# Patient Record
Sex: FEMALE | Race: WHITE | Employment: OTHER | URBAN - METROPOLITAN AREA
[De-identification: names, ages, dates, MRNs, and addresses within clinical notes are randomized per-mention and may not be internally consistent; named-entity substitution may affect disease eponyms.]

---

## 2018-06-19 PROBLEM — E66.01 SEVERE OBESITY (BMI 35.0-39.9): Status: ACTIVE | Noted: 2018-06-19

## 2018-09-28 ENCOUNTER — HOSPITAL ENCOUNTER (OUTPATIENT)
Dept: SLEEP MEDICINE | Age: 78
Discharge: HOME | End: 2018-09-28
Payer: MEDICARE

## 2018-09-28 PROCEDURE — 95806 SLEEP STUDY UNATT&RESP EFFT: CPT

## 2019-02-22 ENCOUNTER — PATIENT OUTREACH (OUTPATIENT)
Dept: CASE MANAGEMENT | Age: 79
End: 2019-02-22

## 2019-02-22 NOTE — PROGRESS NOTES
Ambulatory Care Coordination - Social Work Assessment   Date of referral?    Referral from which RN CM/other source? 2/20/19    Dr. Ailyn Garcia   Previously referred? If so, reason and brief outcome na   Reason for current referral: Home assistance; senior resources - dtr wants pt to be more physically active    Living situation: Lives alone in a private residence with 3 steps to enter   Insurance type? Prescription drug plan? Affordable meds? Obtained where? [facility] involved? Medicare /Cigna. No drug plan. Meds are affordable. [facility]   Income information (if needed):    Food stamps? SS plus a small pension    No food stamps   Transportation ? Adult children   Housing situation? Housing assistance needed? Private residence   Sources of Support: Family? 4 adult children live in town; supportive   [address] involved? CLTC aides/pvt  pay aides? na   DME? [place]   Ambulatory? ADLs - assistance required? Assistive device needed for ambulation? Independent with ambulation and ADLs. Sedentary, per mila, and somewhat unsteady   Referral to CLTC/Medicaid needed? CLTC services explained along with income/asset eligibility. Pt likely not eligible with current level of functioning. Likely over  income for eligibility secondary to assets   Referral to Medicare Extra Help/LIS program needed? na   Small home repair needed? na   MOW referral?  Adequate nutrition? Adult children order groceries on line and pick them up    [place] Risk Assessment? Completed during 2/14 OV   Depression screening? Pt has diagnosis. Goes to counseling occasionally    ACP set up? Needs information? On file   CM Assessed Risk for Readmission:       Patient stated Risk for Readmission:       na     Any other concerns/questions? na   Next steps: See below     BRAD CM in receipt of referral from Dr. Ailyn Garcia.  Spoke with pt's Kyle zaragoza will outreach with [place] on Aging to inquire about and initiate assessment for homemaker, aide ([employer] Program) and medical transportation programs. Will also contact [employer] to inquire about programs, exercise activities, etc.   BRAD PRESSLEY also provided mila with info about Pill Pack as an easier alternative to helping pt manage her meds. No RN CM services needed at this time. Dr. Noe Almonte updated. PLAN  BRAD PRESSLEY will open case for 30 days. Outreach in 2 weeks to check on status of linkage with [employer] and [place] on Aging      This note will not be viewable in [address]

## 2019-03-12 ENCOUNTER — PATIENT OUTREACH (OUTPATIENT)
Dept: CASE MANAGEMENT | Age: 79
End: 2019-03-12

## 2019-03-12 NOTE — PROGRESS NOTES
BRAD PRESSLEY outreach with pt's mila, Andrés Tenorio.  She has the contact info for [employer] on Aging and [employer] [facility] and is aware of how to pursue those programs/services. However, in conversation with pt, family has decided to pursue a referral for outpatient therapy at this time. Carin Grande, will contact Dr. Chinedu Douglas office to obtain a  referral.    PLAN  BRAD PRESSLEY will open case until 3/29. Outreach in 2 weeks to check on status of  o/p therapy referral       This note will not be viewable in [address]

## 2019-03-29 ENCOUNTER — PATIENT OUTREACH (OUTPATIENT)
Dept: CASE MANAGEMENT | Age: 79
End: 2019-03-29

## 2019-03-29 NOTE — PROGRESS NOTES
BRAD PRESSLEY outreach with pt's lucia, Ruy Baker.  She contacted Dr. Jefferson Marte referral coordinator on 3/21, left message requesting referral for o/p therapy. No response yet. She will call Dr. Jefferson Marte office again and follow up. No further needs/concerns expressed by daughter. Outpatient therapy is what the family wants to pursue at this time. Case will be closed. Lucia has BRAD PRESSLEY's contact # if she needs assistance in the future. This note will not be viewable in [address]

## 2022-09-30 ENCOUNTER — HOSPITAL ENCOUNTER (EMERGENCY)
Age: 82
Discharge: HOME | End: 2022-10-01
Attending: EMERGENCY MEDICINE
Payer: MEDICARE

## 2022-09-30 ENCOUNTER — HOSPITAL ENCOUNTER (EMERGENCY)
Dept: GENERAL RADIOLOGY | Age: 82
Discharge: HOME | End: 2022-10-03
Payer: MEDICARE

## 2022-09-30 VITALS
DIASTOLIC BLOOD PRESSURE: 68 MMHG | SYSTOLIC BLOOD PRESSURE: 152 MMHG | OXYGEN SATURATION: 98 % | TEMPERATURE: 97.8 F | HEART RATE: 89 BPM | RESPIRATION RATE: 16 BRPM

## 2022-09-30 DIAGNOSIS — R11.0 NAUSEA WITHOUT VOMITING: ICD-10-CM

## 2022-09-30 DIAGNOSIS — B34.9 VIRAL SYNDROME: Primary | ICD-10-CM

## 2022-09-30 LAB
ALBUMIN SERPL-MCNC: 3.4 G/DL (ref 3.2–4.6)
ALBUMIN/GLOB SERPL: 0.8 {RATIO} (ref 1.2–3.5)
ALP SERPL-CCNC: 59 U/L (ref 50–130)
ALT SERPL-CCNC: 12 U/L (ref 12–65)
ANION GAP SERPL CALC-SCNC: 8 MMOL/L (ref 4–13)
AST SERPL-CCNC: 19 U/L (ref 15–37)
BILIRUB SERPL-MCNC: 0.7 MG/DL (ref 0.2–1.1)
BUN SERPL-MCNC: 14 MG/DL (ref 8–23)
CALCIUM SERPL-MCNC: 9.8 MG/DL (ref 8.3–10.4)
CHLORIDE SERPL-SCNC: 99 MMOL/L (ref 101–110)
CO2 SERPL-SCNC: 26 MMOL/L (ref 21–32)
CREAT SERPL-MCNC: 1.04 MG/DL (ref 0.6–1)
ERYTHROCYTE [DISTWIDTH] IN BLOOD BY AUTOMATED COUNT: 12.7 % (ref 11.9–14.6)
GLOBULIN SER CALC-MCNC: 4.4 G/DL (ref 2.3–3.5)
GLUCOSE SERPL-MCNC: 139 MG/DL (ref 65–100)
HCT VFR BLD AUTO: 45.1 % (ref 35.8–46.3)
HGB BLD-MCNC: 14.7 G/DL (ref 11.7–15.4)
MAGNESIUM SERPL-MCNC: 1.4 MG/DL (ref 1.8–2.4)
MCH RBC QN AUTO: 28.3 PG (ref 26.1–32.9)
MCHC RBC AUTO-ENTMCNC: 32.6 G/DL (ref 31.4–35)
MCV RBC AUTO: 86.9 FL (ref 79.6–97.8)
NRBC # BLD: 0 K/UL (ref 0–0.2)
PLATELET # BLD AUTO: 192 K/UL (ref 150–450)
PMV BLD AUTO: 9 FL (ref 9.4–12.3)
POTASSIUM SERPL-SCNC: 3.6 MMOL/L (ref 3.5–5.1)
PROT SERPL-MCNC: 7.8 G/DL (ref 6.3–8.2)
RBC # BLD AUTO: 5.19 M/UL (ref 4.05–5.2)
SODIUM SERPL-SCNC: 133 MMOL/L (ref 136–145)
TROPONIN I SERPL HS-MCNC: 10.5 PG/ML (ref 0–14)
TSH, 3RD GENERATION: 0.77 UIU/ML (ref 0.36–3.74)
WBC # BLD AUTO: 9.2 K/UL (ref 4.3–11.1)

## 2022-09-30 PROCEDURE — 84443 ASSAY THYROID STIM HORMONE: CPT

## 2022-09-30 PROCEDURE — 71045 X-RAY EXAM CHEST 1 VIEW: CPT

## 2022-09-30 PROCEDURE — 84484 ASSAY OF TROPONIN QUANT: CPT

## 2022-09-30 PROCEDURE — 99285 EMERGENCY DEPT VISIT HI MDM: CPT

## 2022-09-30 PROCEDURE — 96374 THER/PROPH/DIAG INJ IV PUSH: CPT

## 2022-09-30 PROCEDURE — 85027 COMPLETE CBC AUTOMATED: CPT

## 2022-09-30 PROCEDURE — 83735 ASSAY OF MAGNESIUM: CPT

## 2022-09-30 PROCEDURE — 80053 COMPREHEN METABOLIC PANEL: CPT

## 2022-09-30 RX ORDER — DONEPEZIL HYDROCHLORIDE 10 MG/1
10 TABLET, FILM COATED ORAL NIGHTLY
Status: ON HOLD | COMMUNITY
Start: 2022-09-08 | End: 2023-09-08

## 2022-09-30 RX ORDER — DULOXETIN HYDROCHLORIDE 60 MG/1
60 CAPSULE, DELAYED RELEASE ORAL DAILY
Status: ON HOLD | COMMUNITY
Start: 2022-01-20 | End: 2023-01-20

## 2022-09-30 RX ORDER — DULOXETIN HYDROCHLORIDE 30 MG/1
30 CAPSULE, DELAYED RELEASE ORAL EVERY MORNING
Status: ON HOLD | COMMUNITY
Start: 2022-01-20 | End: 2023-01-20

## 2022-09-30 RX ORDER — ONDANSETRON 2 MG/ML
4 INJECTION INTRAMUSCULAR; INTRAVENOUS
Status: COMPLETED | OUTPATIENT
Start: 2022-09-30 | End: 2022-10-01

## 2022-09-30 RX ORDER — LOSARTAN POTASSIUM 100 MG/1
100 TABLET ORAL DAILY
Status: ON HOLD | COMMUNITY
Start: 2022-01-20 | End: 2023-01-20

## 2022-09-30 RX ORDER — HYDROCHLOROTHIAZIDE 25 MG/1
TABLET ORAL
Status: ON HOLD | COMMUNITY
Start: 2022-01-20

## 2022-09-30 RX ORDER — 0.9 % SODIUM CHLORIDE 0.9 %
1000 INTRAVENOUS SOLUTION INTRAVENOUS ONCE
Status: COMPLETED | OUTPATIENT
Start: 2022-09-30 | End: 2022-10-01

## 2022-09-30 ASSESSMENT — LIFESTYLE VARIABLES
HOW OFTEN DO YOU HAVE A DRINK CONTAINING ALCOHOL: NEVER
HOW MANY STANDARD DRINKS CONTAINING ALCOHOL DO YOU HAVE ON A TYPICAL DAY: PATIENT DOES NOT DRINK

## 2022-09-30 ASSESSMENT — PAIN SCALES - GENERAL: PAINLEVEL_OUTOF10: 4

## 2022-10-01 LAB
APPEARANCE UR: ABNORMAL
BACTERIA URNS QL MICRO: 0 /HPF
BILIRUB UR QL: NEGATIVE
CASTS URNS QL MICRO: ABNORMAL /LPF
COLOR UR: ABNORMAL
EKG ATRIAL RATE: 91 BPM
EKG DIAGNOSIS: NORMAL
EKG P AXIS: 22 DEGREES
EKG P-R INTERVAL: 190 MS
EKG Q-T INTERVAL: 338 MS
EKG QRS DURATION: 88 MS
EKG QTC CALCULATION (BAZETT): 415 MS
EKG R AXIS: -30 DEGREES
EKG T AXIS: 63 DEGREES
EKG VENTRICULAR RATE: 91 BPM
EPI CELLS #/AREA URNS HPF: ABNORMAL /HPF
GLUCOSE UR STRIP.AUTO-MCNC: NEGATIVE MG/DL
HGB UR QL STRIP: ABNORMAL
KETONES UR QL STRIP.AUTO: ABNORMAL MG/DL
LEUKOCYTE ESTERASE UR QL STRIP.AUTO: NEGATIVE
MUCOUS THREADS URNS QL MICRO: ABNORMAL /LPF
NITRITE UR QL STRIP.AUTO: NEGATIVE
OTHER OBSERVATIONS: ABNORMAL
PH UR STRIP: 5 [PH] (ref 5–9)
PROT UR STRIP-MCNC: 100 MG/DL
RBC #/AREA URNS HPF: ABNORMAL /HPF
SARS-COV-2 RDRP RESP QL NAA+PROBE: NOT DETECTED
SOURCE: NORMAL
SP GR UR REFRACTOMETRY: 1.03 (ref 1–1.02)
TROPONIN I SERPL HS-MCNC: 10 PG/ML (ref 0–14)
UROBILINOGEN UR QL STRIP.AUTO: 1 EU/DL (ref 0.2–1)
WBC URNS QL MICRO: ABNORMAL /HPF
YEAST URNS QL MICRO: ABNORMAL

## 2022-10-01 PROCEDURE — 87635 SARS-COV-2 COVID-19 AMP PRB: CPT

## 2022-10-01 PROCEDURE — 84484 ASSAY OF TROPONIN QUANT: CPT

## 2022-10-01 PROCEDURE — 6360000002 HC RX W HCPCS: Performed by: EMERGENCY MEDICINE

## 2022-10-01 PROCEDURE — 2580000003 HC RX 258: Performed by: EMERGENCY MEDICINE

## 2022-10-01 PROCEDURE — 81001 URINALYSIS AUTO W/SCOPE: CPT

## 2022-10-01 RX ORDER — ONDANSETRON HYDROCHLORIDE 8 MG/1
8 TABLET, FILM COATED ORAL 3 TIMES DAILY PRN
Qty: 12 TABLET | Refills: 1 | Status: ON HOLD | OUTPATIENT
Start: 2022-10-01

## 2022-10-01 RX ORDER — FLUCONAZOLE 150 MG/1
150 TABLET ORAL DAILY
Qty: 3 TABLET | Refills: 0 | Status: SHIPPED | OUTPATIENT
Start: 2022-10-01 | End: 2022-10-04

## 2022-10-01 RX ADMIN — ONDANSETRON 4 MG: 2 INJECTION INTRAMUSCULAR; INTRAVENOUS at 00:08

## 2022-10-01 RX ADMIN — SODIUM CHLORIDE 1000 ML: 9 INJECTION, SOLUTION INTRAVENOUS at 00:07

## 2022-10-01 ASSESSMENT — ENCOUNTER SYMPTOMS
COLOR CHANGE: 0
ABDOMINAL PAIN: 0
SHORTNESS OF BREATH: 0
BACK PAIN: 1
FACIAL SWELLING: 0
EYE DISCHARGE: 0
RHINORRHEA: 0
COUGH: 0
EYE REDNESS: 0
NAUSEA: 1
VOMITING: 0

## 2022-10-01 NOTE — ED NOTES
I have reviewed discharge instructions with the patient. The patient verbalized understanding. Patient left ED via Discharge Method: wheelchair to Home with family. Opportunity for questions and clarification provided. Patient given 1 scripts. To continue your aftercare when you leave the hospital, you may receive an automated call from our care team to check in on how you are doing. This is a free service and part of our promise to provide the best care and service to meet your aftercare needs.  If you have questions, or wish to unsubscribe from this service please call [phone]. Thank you for Choosing our [address] [place] [address] Emergency Department.        Jessica Meyer RN  10/01/22 [id number]

## 2022-10-01 NOTE — ED PROVIDER NOTES
[facility] Emergency Department Provider Note                   PCP:                Koffi Degroot MD               Age: 79 y.o. Sex: female       ICD-10-CM    1. Viral syndrome  B34.9       2. Nausea without vomiting  R11.0           DISPOSITION Decision To Discharge 10/01/2022 01:24:36 AM       Discharge Medication List as of 10/1/2022  1:28 AM        START taking these medications    Details   ondansetron (ZOFRAN) 8 MG tablet Take 1 tablet by mouth 3 times daily as needed for Nausea or Vomiting, Disp-12 tablet, R-1Print             Orders Placed This Encounter   Procedures    COVID-19, Rapid    XR CHEST PORTABLE    CBC    Comprehensive Metabolic Panel    Troponin    TSH    Magnesium    Urinalysis    Straight cath    EKG 12 Lead         Hillary Lopez is a 79 y.o. female who presents to the Emergency Department with chief complaint of    Chief Complaint   Patient presents with    Palpitations      Chief complaint : Malaise and palpitations    HISTORY OF PRESENT ILLNESS :  Location : Generalized CV    Quality : Heart racing around 110    Quantity : Intermittent    Timing : This afternoon    Severity : Mild    Context : No ill contacts, recently treated for UTI    Alleviating / exacerbating factors :     Associated Symptoms : Nausea but no vomiting    -------------------------------    FAMILY HISTORY : Noncontributory    SURGICAL HISTORY : Appendectomy hysterectomy shoulder replacement, tubal ligation     MEDICAL HISTORY : Hypertension but no diabetes, asthma, hepatitis    SOCIAL HISTORY : Single, non-smoker, nondrinker        Review of Systems   Constitutional:  Positive for activity change and fatigue. Negative for chills and fever. HENT:  Negative for facial swelling and rhinorrhea. Eyes:  Negative for discharge and redness. Respiratory:  Negative for cough and shortness of breath. Cardiovascular:  Negative for chest pain and palpitations. Gastrointestinal:  Positive for nausea.  Negative for abdominal pain and vomiting. Endocrine: Negative for polydipsia and polyuria. Genitourinary:  Negative for difficulty urinating, dysuria, frequency and urgency. Musculoskeletal:  Positive for arthralgias, back pain and myalgias. Skin:  Negative for color change and pallor. Neurological:  Negative for dizziness and headaches. All other systems reviewed and are negative. All other systems reviewed and are negative. History reviewed. No pertinent past medical history. History reviewed. No pertinent surgical history. History reviewed. No pertinent family history. Social Connections: Not on file        No Known Allergies     Vitals signs and nursing note reviewed. No data found. Physical Exam  Vitals and nursing note reviewed. Constitutional:       General: She is not in acute distress. Appearance: Normal appearance. She is normal weight. She is not ill-appearing, toxic-appearing or diaphoretic. HENT:      Head: Normocephalic and atraumatic. Right Ear: External ear normal.      Left Ear: External ear normal.      Nose: Nose normal. No congestion or rhinorrhea. Mouth/Throat:      Mouth: Mucous membranes are moist.      Pharynx: Oropharynx is clear. No oropharyngeal exudate or posterior oropharyngeal erythema. Eyes:      General: No scleral icterus. Right eye: No discharge. Left eye: No discharge. Extraocular Movements: Extraocular movements intact. Conjunctiva/sclera: Conjunctivae normal.      Pupils: Pupils are equal, round, and reactive to light. Cardiovascular:      Rate and Rhythm: Normal rate and regular rhythm. Heart sounds: No murmur heard. Pulmonary:      Effort: Pulmonary effort is normal. No respiratory distress. Breath sounds: Normal breath sounds. Abdominal:      General: Abdomen is flat. Palpations: Abdomen is soft. There is no fluid wave or pulsatile mass. Tenderness: There is no abdominal tenderness. There is no guarding or rebound. Musculoskeletal:         General: No deformity or signs of injury. Normal range of motion. Cervical back: Normal range of motion and neck supple. No rigidity or tenderness. Skin:     General: Skin is warm and dry. Coloration: Skin is not jaundiced or pale. Neurological:      General: No focal deficit present. Mental Status: She is alert and oriented to person, place, and time. Psychiatric:         Mood and Affect: Mood normal.         Behavior: Behavior normal.         Thought Content:  Thought content normal.        MDM  Number of Diagnoses or Management Options  Nausea without vomiting: new, needed workup  Viral syndrome: new, needed workup  Diagnosis management comments: Malaise and palpitations, negative work-up, suspect viral syndrome       Amount and/or Complexity of Data Reviewed  Clinical lab tests: reviewed and ordered  Tests in the radiology section of CPT®: reviewed and ordered  Decide to obtain previous medical records or to obtain history from someone other than the patient: yes  Obtain history from someone other than the patient: yes (Daughter at bedside)    Risk of Complications, Morbidity, and/or Mortality  Presenting problems: moderate  Diagnostic procedures: low  Management options: low    Patient Progress  Patient progress: improved      Procedures    Labs Reviewed   CBC - Abnormal; Notable for the following components:       Result Value    MPV 9.0 (*)     All other components within normal limits   COMPREHENSIVE METABOLIC PANEL - Abnormal; Notable for the following components:    Sodium 133 (*)     Chloride 99 (*)     Glucose 139 (*)     Creatinine 1.04 (*)     GFR Non- 54 (*)     Globulin 4.4 (*)     Albumin/Globulin Ratio 0.8 (*)     All other components within normal limits   MAGNESIUM - Abnormal; Notable for the following components:    Magnesium 1.4 (*)     All other components within normal limits   URINALYSIS - Abnormal; Notable for the following components:    Specific Gravity, UA 1.026 (*)     Protein,  (*)     Ketones, Urine TRACE (*)     Blood, Urine LARGE (*)     Mucus, UA 4+ (*)     All other components within normal limits   COVID-19, RAPID   TROPONIN   TROPONIN   TSH        XR CHEST PORTABLE   Final Result   No evidence of an acute intrathoracic process. Arjun Coma Scale  Eye Opening: Spontaneous  Best Verbal Response: Oriented  Best Motor Response: Obeys commands  [place] Coma Scale Score: 15                     Voice dictation software was used during the making of this note. This software is not perfect and grammatical and other typographical errors may be present. This note has not been completely proofread for errors.        Leela Infante MD  10/01/22 [id number]

## 2022-10-01 NOTE — ED TRIAGE NOTES
Pt daughter reports patient has been having palpitations, shoulder and neck pain since this morning.     Makeda Sharif RN

## 2022-10-21 ENCOUNTER — HOSPICE ADMISSION (OUTPATIENT)
Dept: HOSPICE | Facility: HOSPICE | Age: 82
End: 2022-10-21
Payer: MEDICARE

## 2022-10-21 PROBLEM — R22.2 PERIAORTIC MASS: Status: ACTIVE | Noted: 2022-10-16

## 2022-10-21 PROBLEM — E66.01 SEVERE OBESITY WITH BODY MASS INDEX (BMI) OF 35.0 TO 39.9 WITH SERIOUS COMORBIDITY (HCC): Status: ACTIVE | Noted: 2018-06-19

## 2022-10-21 PROBLEM — J45.909 ASTHMA: Status: ACTIVE | Noted: 2022-10-21

## 2022-10-21 PROBLEM — J90 LOCULATED PLEURAL EFFUSION: Status: ACTIVE | Noted: 2022-10-16

## 2022-10-21 PROBLEM — E66.01 OBESITY, MORBID (HCC): Status: ACTIVE | Noted: 2022-01-20

## 2022-10-22 PROBLEM — Z87.09 HX OF ACUTE RESPIRATORY FAILURE: Status: ACTIVE | Noted: 2022-10-22

## 2022-10-22 PROCEDURE — 0656 HSPC GENERAL INPATIENT

## 2022-10-23 PROCEDURE — 0656 HSPC GENERAL INPATIENT

## 2022-10-24 PROCEDURE — 0656 HSPC GENERAL INPATIENT

## 2022-10-25 PROCEDURE — 0656 HSPC GENERAL INPATIENT

## 2022-10-26 PROCEDURE — 0656 HSPC GENERAL INPATIENT

## 2022-10-27 PROCEDURE — 0656 HSPC GENERAL INPATIENT

## 2022-10-28 PROCEDURE — 0656 HSPC GENERAL INPATIENT

## 2022-10-29 PROCEDURE — 0656 HSPC GENERAL INPATIENT

## 2022-10-30 PROCEDURE — 0656 HSPC GENERAL INPATIENT

## 2022-10-31 PROCEDURE — 0656 HSPC GENERAL INPATIENT

## 2022-11-01 PROCEDURE — 0656 HSPC GENERAL INPATIENT

## 2022-11-02 PROCEDURE — 0656 HSPC GENERAL INPATIENT

## 2022-11-03 PROBLEM — J15.211 MSSA (METHICILLIN SUSCEPTIBLE STAPHYLOCOCCUS AUREUS) PNEUMONIA (HCC): Status: ACTIVE | Noted: 2022-11-03

## 2022-11-03 PROCEDURE — 0651 HSPC ROUTINE HOME CARE

## 2022-11-04 PROCEDURE — 0651 HSPC ROUTINE HOME CARE

## 2022-11-05 PROCEDURE — 0658 HSPC ROOM AND BOARD

## 2022-11-05 PROCEDURE — 0651 HSPC ROUTINE HOME CARE

## 2022-11-06 PROCEDURE — 0658 HSPC ROOM AND BOARD

## 2022-11-06 PROCEDURE — 0651 HSPC ROUTINE HOME CARE

## 2022-11-07 PROCEDURE — 0658 HSPC ROOM AND BOARD

## 2022-11-07 PROCEDURE — 0651 HSPC ROUTINE HOME CARE

## 2022-11-08 PROCEDURE — 0651 HSPC ROUTINE HOME CARE

## 2022-11-08 PROCEDURE — 0658 HSPC ROOM AND BOARD

## 2022-11-09 PROCEDURE — 0651 HSPC ROUTINE HOME CARE

## 2022-11-09 PROCEDURE — 0658 HSPC ROOM AND BOARD

## 2022-11-10 PROCEDURE — 0658 HSPC ROOM AND BOARD

## 2022-11-10 PROCEDURE — 0651 HSPC ROUTINE HOME CARE

## 2022-11-11 PROCEDURE — 0658 HSPC ROOM AND BOARD

## 2022-11-11 PROCEDURE — 0651 HSPC ROUTINE HOME CARE

## 2022-11-12 PROCEDURE — 0658 HSPC ROOM AND BOARD

## 2022-11-12 PROCEDURE — 0651 HSPC ROUTINE HOME CARE

## 2022-11-13 PROCEDURE — 0658 HSPC ROOM AND BOARD

## 2022-11-13 PROCEDURE — 0651 HSPC ROUTINE HOME CARE

## 2022-11-14 PROCEDURE — 0651 HSPC ROUTINE HOME CARE

## 2022-11-14 PROCEDURE — 0658 HSPC ROOM AND BOARD

## 2022-11-15 PROCEDURE — 0658 HSPC ROOM AND BOARD

## 2022-11-15 PROCEDURE — 0651 HSPC ROUTINE HOME CARE

## 2022-11-16 PROCEDURE — 0651 HSPC ROUTINE HOME CARE

## 2022-11-16 PROCEDURE — 0658 HSPC ROOM AND BOARD

## 2023-03-03 ENCOUNTER — TELEPHONE (OUTPATIENT)
Dept: PALLATIVE CARE | Age: 83
End: 2023-03-03